# Patient Record
Sex: FEMALE | Race: WHITE
[De-identification: names, ages, dates, MRNs, and addresses within clinical notes are randomized per-mention and may not be internally consistent; named-entity substitution may affect disease eponyms.]

---

## 2019-07-29 ENCOUNTER — HOSPITAL ENCOUNTER (OUTPATIENT)
Dept: HOSPITAL 89 - MAMO | Age: 59
End: 2019-07-29
Attending: OBSTETRICS & GYNECOLOGY
Payer: COMMERCIAL

## 2019-07-29 DIAGNOSIS — Z12.31: Primary | ICD-10-CM

## 2019-07-29 PROCEDURE — 77067 SCR MAMMO BI INCL CAD: CPT

## 2019-07-29 PROCEDURE — 77063 BREAST TOMOSYNTHESIS BI: CPT

## 2019-07-30 NOTE — RADIOLOGY IMAGING REPORT
FACILITY: [facility] - [place] 

 

PATIENT NAME: ASHLEY STINSON

: [id number]

MR: [id number]

V: [id number]

EXAM DATE: [id number]

ORDERING PHYSICIAN: FLAVIO JENSEN

TECHNOLOGIST: Samina Livingston

 

PROCEDURE: BILATERAL DIGITAL SCREENING MAMMOGRAM WITH CAD ASSISTED 

INTERPRETATION & 3D TOMOSYNTHESIS 

 

REASON FOR STUDY: Screening.  

 

COMPARISON: 6/23/15. 

 

VIEWS OBTAINED: 2D & 3D full field CC & MLO projections. 

 

BREAST DENSITY: The breast parenchyma is mostly fatty replaced. 

 

MAMMOGRAM FINDINGS: There are no mammographic findings concerning for 

malignancy. No significant interval change.

 

IMPRESSION: 

BIRADS 1: Negative.

 

DIAGNOSTIC CATEGORY 1--NEGATIVE.  

 

 

RECOMMENDATIONS:

ROUTINE MAMMOGRAM AND CLINICAL EVALUATION IN 1YR.   

 

Dictated by:  Mariano Mccoy on 2019 at 9:12   

Transcribed by: FIX on 2019 at 10:39    

Approved by:  Mariano Mccoy on 2019 at 10:41   

[facility]